# Patient Record
Sex: MALE | URBAN - METROPOLITAN AREA
[De-identification: names, ages, dates, MRNs, and addresses within clinical notes are randomized per-mention and may not be internally consistent; named-entity substitution may affect disease eponyms.]

---

## 2018-12-17 ENCOUNTER — TELEPHONE (OUTPATIENT)
Dept: PAIN MEDICINE | Facility: CLINIC | Age: 58
End: 2018-12-17

## 2018-12-17 NOTE — TELEPHONE ENCOUNTER
Patient is calling because he was referred from his PCP to see pain management  The patient states that he is on many medication but the main one is Oxycodone  His PCP is referring him to pain management to take over prescribing him medications  He will be having his records and referral faxed over to the office for review

## 2018-12-18 ENCOUNTER — TELEPHONE (OUTPATIENT)
Dept: PAIN MEDICINE | Facility: CLINIC | Age: 58
End: 2018-12-18

## 2018-12-18 NOTE — TELEPHONE ENCOUNTER
S/w Monica at Dr Saritha Villa office  Per Lillian Lemon, pt has been advised by [facility] that there is nothing to offer  Per Lillian Lemon, [facility] states they will not pay unless the pt is seen by pain management  Several of their patients have been referred to pain management offices and refused  Questioned if pain management is strictly procedures or is med management included? Daniel Chavez, in general, if a pt is not appropriate for injections or procedures, they may not be appropriate for [facility] as opioids are not an appropriate means of managing chronic pain  Per Lillian Lemon, their patients are on opioids and looking for pain management to take over or wean off opioids in order to be covered by insurance  Daniel Chavez, this office does not assume opioid rx's  Monica verbalized understanding  Forwarding to JESÚS ROBERTS